# Patient Record
Sex: MALE | Race: WHITE | NOT HISPANIC OR LATINO | URBAN - METROPOLITAN AREA
[De-identification: names, ages, dates, MRNs, and addresses within clinical notes are randomized per-mention and may not be internally consistent; named-entity substitution may affect disease eponyms.]

---

## 2023-05-28 ENCOUNTER — EMERGENCY (EMERGENCY)
Facility: HOSPITAL | Age: 33
LOS: 0 days | Discharge: HOME | End: 2023-05-28
Attending: HOSPITALIST
Payer: COMMERCIAL

## 2023-05-28 VITALS
DIASTOLIC BLOOD PRESSURE: 100 MMHG | OXYGEN SATURATION: 97 % | HEART RATE: 93 BPM | SYSTOLIC BLOOD PRESSURE: 145 MMHG | RESPIRATION RATE: 18 BRPM | TEMPERATURE: 98 F

## 2023-05-28 VITALS — WEIGHT: 199.96 LBS | HEIGHT: 75 IN

## 2023-05-28 DIAGNOSIS — S01.21XA LACERATION WITHOUT FOREIGN BODY OF NOSE, INITIAL ENCOUNTER: ICD-10-CM
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
## 2023-05-28 PROCEDURE — 99284 EMERGENCY DEPT VISIT MOD MDM: CPT | Mod: 25

## 2023-05-28 PROCEDURE — 99283 EMERGENCY DEPT VISIT LOW MDM: CPT | Mod: 25

## 2023-05-28 PROCEDURE — 12013 RPR F/E/E/N/L/M 2.6-5.0 CM: CPT

## 2023-05-28 RX ADMIN — Medication 1 TABLET(S): at 05:40

## 2023-05-28 NOTE — ED PROVIDER NOTE - OBJECTIVE STATEMENT
32-year-old male presents after accidental bite from his dog.  Patient states he was playing with his puppy when the dog jumped up and bit him just above his upper lip under his nose.  Noted bleeding and came to ED.  Had a tetanus shot recently within the past few years.  Dog has all of its shots/vaccines

## 2023-05-28 NOTE — ED ADULT NURSE NOTE - NSFALLUNIVINTERV_ED_ALL_ED
Bed/Stretcher in lowest position, wheels locked, appropriate side rails in place/Call bell, personal items and telephone in reach/Instruct patient to call for assistance before getting out of bed/chair/stretcher/Non-slip footwear applied when patient is off stretcher/[place] to call system/Physically safe environment - no spills, clutter or unnecessary equipment/Purposeful proactive rounding/Room/bathroom lighting operational, light cord in reach

## 2023-05-28 NOTE — ED PROVIDER NOTE - NSFOLLOWUPINSTRUCTIONS_ED_ALL_ED_FT
Take tylenol as needed for pain, 650Mg every 6-8 hours.  You can also take ibuprofen as needed for pain, 600mg every 6-8 hours, take with food.    Please return in 7 days for removal of stitches or you can follow-up with your primary care doctor.   As we discussed keep the area clean and dry and covered to avoid infection and scarring from the sun.  Please do not scrub the area although it can get wet with water and then just dab it dry.

## 2023-05-28 NOTE — ED PROVIDER NOTE - PATIENT PORTAL LINK FT
You can access the FollowMyHealth Patient Portal offered by [facility] by registering at the following website: [email or web address]. By joining [facility]’s FollowMyHealth portal, you will also be able to view your health information using other applications (apps) compatible with our system.

## 2023-05-28 NOTE — ED PROVIDER NOTE - CLINICAL SUMMARY MEDICAL DECISION MAKING FREE TEXT BOX
32-year-old male with dog bite and subsequent simple laceration to his left face.  will clean area and closed with stitches.  We will give pain medicine and Augmentin.  Called [facility] to report dog bite.  They will reach out to patient to follow-up.  Patient instructed to please contact them or come to an ED if there is any changes in the dog's behavior within the next 10 days.  No recommendations to  start rabies series at this time.

## 2023-05-28 NOTE — ED ADULT NURSE NOTE - OBJECTIVE STATEMENT
pt reports his small dog attached himself to his face when he woke up this AM. pt reports his dog does not have a hx of violence. as per pt dog is about 1 yr old and is UTD on vaccines. laceration noted to upper lip - bleeding controlled. no other complaints at this time. pt resting comfortably in bed reporting some pain in no acute distress.

## 2023-05-28 NOTE — ED PROVIDER NOTE - PHYSICAL EXAMINATION
***GEN - NAD; well appearing; A+O x3 ***HEAD - NC/AT ***EYES/NOSE - PERRL, EOMI, mucous membranes moist, no discharge ***THROAT/MOUTH: Oral cavity and pharynx normal. No inflammation, swelling, exudate, or lesions.  ***NECK: Neck supple  ***PULMONARY - CTA b/l, symmetric breath sounds. ***CARDIAC -s1s2, RRR, no M,G,R  ***ABDOMEN - +BS, ND, NT, soft  ***BACK - no CVA tenderness, Normal  spine ***EXTREMITIES - symmetric pulses, 2+ dp, capillary refill < 2 seconds***SKIN - 3cm linear laceration to left face just beneath left nostril above upper lip. does not cross the vermillion border or go thru into oral mucosa..   ***NEUROLOGIC - alert, CN 2-12 intact

## 2023-05-28 NOTE — ED ADULT TRIAGE NOTE - CHIEF COMPLAINT QUOTE
Pt. presents to ED with dog bite to his upper lip. Pt. reports his own dog bit him prior to arrival. Dog vaccines up to date. Bleeding controlled in triage